# Patient Record
Sex: FEMALE | Race: WHITE | NOT HISPANIC OR LATINO
[De-identification: names, ages, dates, MRNs, and addresses within clinical notes are randomized per-mention and may not be internally consistent; named-entity substitution may affect disease eponyms.]

---

## 2022-03-10 ENCOUNTER — APPOINTMENT (OUTPATIENT)
Dept: FAMILY MEDICINE | Facility: CLINIC | Age: 37
End: 2022-03-10
Payer: COMMERCIAL

## 2022-03-10 VITALS
OXYGEN SATURATION: 98 % | HEART RATE: 84 BPM | DIASTOLIC BLOOD PRESSURE: 72 MMHG | TEMPERATURE: 98 F | BODY MASS INDEX: 22.86 KG/M2 | WEIGHT: 129 LBS | SYSTOLIC BLOOD PRESSURE: 110 MMHG | HEIGHT: 63 IN

## 2022-03-10 DIAGNOSIS — F90.9 ATTENTION-DEFICIT HYPERACTIVITY DISORDER, UNSPECIFIED TYPE: ICD-10-CM

## 2022-03-10 DIAGNOSIS — Z81.1 FAMILY HISTORY OF ALCOHOL ABUSE AND DEPENDENCE: ICD-10-CM

## 2022-03-10 DIAGNOSIS — Z81.8 FAMILY HISTORY OF OTHER MENTAL AND BEHAVIORAL DISORDERS: ICD-10-CM

## 2022-03-10 DIAGNOSIS — Z80.3 FAMILY HISTORY OF MALIGNANT NEOPLASM OF BREAST: ICD-10-CM

## 2022-03-10 DIAGNOSIS — Z87.19 PERSONAL HISTORY OF OTHER DISEASES OF THE DIGESTIVE SYSTEM: ICD-10-CM

## 2022-03-10 DIAGNOSIS — Z80.0 FAMILY HISTORY OF MALIGNANT NEOPLASM OF DIGESTIVE ORGANS: ICD-10-CM

## 2022-03-10 DIAGNOSIS — F32.A ANXIETY DISORDER, UNSPECIFIED: ICD-10-CM

## 2022-03-10 DIAGNOSIS — Z80.9 FAMILY HISTORY OF MALIGNANT NEOPLASM, UNSPECIFIED: ICD-10-CM

## 2022-03-10 DIAGNOSIS — Z86.16 PERSONAL HISTORY OF COVID-19: ICD-10-CM

## 2022-03-10 DIAGNOSIS — Z00.00 ENCOUNTER FOR GENERAL ADULT MEDICAL EXAMINATION W/OUT ABNORMAL FINDINGS: ICD-10-CM

## 2022-03-10 DIAGNOSIS — R53.83 OTHER FATIGUE: ICD-10-CM

## 2022-03-10 DIAGNOSIS — F41.9 ANXIETY DISORDER, UNSPECIFIED: ICD-10-CM

## 2022-03-10 DIAGNOSIS — Z82.49 FAMILY HISTORY OF ISCHEMIC HEART DISEASE AND OTHER DISEASES OF THE CIRCULATORY SYSTEM: ICD-10-CM

## 2022-03-10 PROCEDURE — 99385 PREV VISIT NEW AGE 18-39: CPT | Mod: 25

## 2022-03-10 RX ORDER — BUPROPION HYDROCHLORIDE 150 MG/1
150 TABLET, EXTENDED RELEASE ORAL
Refills: 0 | Status: ACTIVE | COMMUNITY

## 2022-03-10 RX ORDER — BUPROPION HYDROCHLORIDE 300 MG/1
300 TABLET, EXTENDED RELEASE ORAL
Refills: 0 | Status: ACTIVE | COMMUNITY

## 2022-03-10 RX ORDER — ALPRAZOLAM 1 MG/1
1 TABLET ORAL
Refills: 0 | Status: ACTIVE | COMMUNITY

## 2022-03-10 RX ORDER — CETIRIZINE HCL 10 MG
TABLET ORAL
Refills: 0 | Status: ACTIVE | COMMUNITY

## 2022-03-10 RX ORDER — ETONOGESTREL AND ETHINYL ESTRADIOL .12; .015 MG/D; MG/D
0.12-0.015 INSERT, EXTENDED RELEASE VAGINAL
Refills: 0 | Status: ACTIVE | COMMUNITY

## 2022-03-10 NOTE — HISTORY OF PRESENT ILLNESS
[FreeTextEntry1] : Ms. DOLL presents for annual physical.\par  [de-identified] : Ms. DOLL presents for annual physical.\par \par Meets with psych pharmacologist-saw recently; India Duncan in [place]; ADHD, Depression and Anxiety \par Meets with Therapist weekly. \par Wellbutrin 450mg 2 tablets regular release\par Xanax 1mg prn \par Nuvaring for birth control\par \par -Sleeps 8 hours on average\par Works as [employer] fully remote. \par \par Takes Zyrtec as needed for allergies.\par -Allergies to tree nuts, grasses, trees, pollen, dogs and cats \par \par Sx: No surgeries \par Family Hx: Had genetic testing for cancer during pregnancy. \par Paternal Grandma-BRCA passed.\par Dad colon cancer at age 63. Grandpa-liver cancer, maternal gma-CHF, CLL \par Maternal GPA-CHF\par \par 2 children both vaginal deliveries, uncomplicated pregnancies.\par Had COVID January 5th 2022.  Decreased smell. Headaches. \par Had fever, body aches, headaches and nausea, loss of taste and smell. Felt foggy.\par \par COVID initial series completed. \par Scheduling her booster. \par \par Had gastroenteritis in early february.   \par Baby had cold recently.   \par \par Hx of Hiatal Hernia, Hx of IBS;  had endoscopy and colonoscopy.  Hx SIBO.  Last time saw GI before pregnancy. Had ulcer. Was on carafate in 2019.  Hx of Lactose intolerance.

## 2022-03-10 NOTE — PLAN
[FreeTextEntry1] : Will follow up labwork drawn in office today.  Stable exam.  \par \par HM-had pap smear 2019.\par Mood following with therapist and specialist. \par \par Will adjust meds based on labs. \par Patient expressed understanding of plan.\par

## 2022-03-10 NOTE — PHYSICAL EXAM
[Normal Sclera/Conjunctiva] : normal sclera/conjunctiva [Normal Oropharynx] : the oropharynx was normal [Normal TMs] : both tympanic membranes were normal [No Lymphadenopathy] : no lymphadenopathy [Supple] : supple [No Edema] : there was no peripheral edema [No Extremity Clubbing/Cyanosis] : no extremity clubbing/cyanosis [Normal] : affect was normal and insight and judgment were intact

## 2022-03-10 NOTE — HEALTH RISK ASSESSMENT
[Former] : Former [Yes] : Yes [2 - 3 times a week (3 pts)] : 2 - 3  times a week (3 points) [Patient reported PAP Smear was normal] : Patient reported PAP Smear was normal [HIV test declined] : HIV test declined [With Significant Other] : lives with significant other [# of Members in Household ___] :  household currently consist of [unfilled] member(s) [Employed] : employed [# Of Children ___] : has [unfilled] children [de-identified] : marijuana [YearQuit] : 2006 [de-identified] : wine [de-identified] : not exercising [PapSmearDate] : 01/19 [FreeTextEntry2] : Full Time-Fully Remote  [FreeTextEntry3] : Children healthy 16 months, 7 yo

## 2022-03-10 NOTE — PAST MEDICAL HISTORY
[Menstruating] : menstruating [Regular Cycle Intervals] : have been regular [FreeTextEntry1] : LMP 1 week ago

## 2022-03-10 NOTE — REVIEW OF SYSTEMS
[Postnasal Drip] : postnasal drip [Negative] : Genitourinary [Earache] : no earache [Sore Throat] : no sore throat [Shortness Of Breath] : no shortness of breath [Wheezing] : no wheezing [Cough] : no cough [Nausea] : no nausea [Constipation] : no constipation [Diarrhea] : diarrhea [Headache] : no headache [Vomiting] : no vomiting [Dizziness] : no dizziness [Suicidal] : not suicidal [FreeTextEntry7] : TUMS relieving heartburn symptoms [FreeTextEntry2] : some fatigue [de-identified] : Mood doing okay; therapist appointment tonight.

## 2022-03-11 LAB
25(OH)D3 SERPL-MCNC: 22 NG/ML
ALBUMIN SERPL ELPH-MCNC: 4.4 G/DL
ALP BLD-CCNC: 49 U/L
ALT SERPL-CCNC: 10 U/L
ANION GAP SERPL CALC-SCNC: 12 MMOL/L
AST SERPL-CCNC: 10 U/L
BASOPHILS # BLD AUTO: 0.03 K/UL
BASOPHILS NFR BLD AUTO: 0.5 %
BILIRUB SERPL-MCNC: 0.4 MG/DL
BUN SERPL-MCNC: 16 MG/DL
CALCIUM SERPL-MCNC: 10 MG/DL
CHLORIDE SERPL-SCNC: 104 MMOL/L
CHOLEST SERPL-MCNC: 199 MG/DL
CO2 SERPL-SCNC: 25 MMOL/L
CREAT SERPL-MCNC: 0.72 MG/DL
EGFR: 110 ML/MIN/1.73M2
EOSINOPHIL # BLD AUTO: 0.06 K/UL
EOSINOPHIL NFR BLD AUTO: 1 %
ESTIMATED AVERAGE GLUCOSE: 105 MG/DL
FOLATE SERPL-MCNC: 17 NG/ML
GLUCOSE SERPL-MCNC: 89 MG/DL
HBA1C MFR BLD HPLC: 5.3 %
HCT VFR BLD CALC: 42.8 %
HDLC SERPL-MCNC: 56 MG/DL
HGB BLD-MCNC: 13.3 G/DL
IMM GRANULOCYTES NFR BLD AUTO: 0.2 %
LDLC SERPL CALC-MCNC: 122 MG/DL
LYMPHOCYTES # BLD AUTO: 1.79 K/UL
LYMPHOCYTES NFR BLD AUTO: 29.5 %
MAN DIFF?: NORMAL
MCHC RBC-ENTMCNC: 26.8 PG
MCHC RBC-ENTMCNC: 31.1 GM/DL
MCV RBC AUTO: 86.3 FL
MONOCYTES # BLD AUTO: 0.49 K/UL
MONOCYTES NFR BLD AUTO: 8.1 %
NEUTROPHILS # BLD AUTO: 3.68 K/UL
NEUTROPHILS NFR BLD AUTO: 60.7 %
NONHDLC SERPL-MCNC: 143 MG/DL
PLATELET # BLD AUTO: 201 K/UL
POTASSIUM SERPL-SCNC: 4.7 MMOL/L
PROT SERPL-MCNC: 6.6 G/DL
RBC # BLD: 4.96 M/UL
RBC # FLD: 12.9 %
SODIUM SERPL-SCNC: 141 MMOL/L
TRIGL SERPL-MCNC: 105 MG/DL
TSH SERPL-ACNC: 1.96 UIU/ML
VIT B12 SERPL-MCNC: 398 PG/ML
WBC # FLD AUTO: 6.06 K/UL

## 2022-04-11 ENCOUNTER — TRANSCRIPTION ENCOUNTER (OUTPATIENT)
Age: 37
End: 2022-04-11

## 2022-04-12 ENCOUNTER — TRANSCRIPTION ENCOUNTER (OUTPATIENT)
Age: 37
End: 2022-04-12

## 2022-09-18 ENCOUNTER — NON-APPOINTMENT (OUTPATIENT)
Age: 37
End: 2022-09-18

## 2022-10-06 ENCOUNTER — NON-APPOINTMENT (OUTPATIENT)
Age: 37
End: 2022-10-06

## 2023-01-12 ENCOUNTER — APPOINTMENT (OUTPATIENT)
Dept: ULTRASOUND IMAGING | Facility: HOSPITAL | Age: 38
End: 2023-01-12

## 2023-01-12 ENCOUNTER — APPOINTMENT (OUTPATIENT)
Dept: NUCLEAR MEDICINE | Facility: HOSPITAL | Age: 38
End: 2023-01-12

## 2023-01-19 ENCOUNTER — APPOINTMENT (OUTPATIENT)
Dept: NUCLEAR MEDICINE | Facility: HOSPITAL | Age: 38
End: 2023-01-19

## 2023-01-19 ENCOUNTER — APPOINTMENT (OUTPATIENT)
Dept: ULTRASOUND IMAGING | Facility: HOSPITAL | Age: 38
End: 2023-01-19

## 2023-01-19 ENCOUNTER — TRANSCRIPTION ENCOUNTER (OUTPATIENT)
Age: 38
End: 2023-01-19

## 2023-01-19 ENCOUNTER — OUTPATIENT (OUTPATIENT)
Dept: OUTPATIENT SERVICES | Facility: HOSPITAL | Age: 38
LOS: 1 days | End: 2023-01-19
Payer: COMMERCIAL

## 2023-01-19 PROCEDURE — C9803: CPT

## 2023-01-19 PROCEDURE — 78227 HEPATOBIL SYST IMAGE W/DRUG: CPT

## 2023-01-19 PROCEDURE — 76700 US EXAM ABDOM COMPLETE: CPT

## 2023-01-19 PROCEDURE — 78227 HEPATOBIL SYST IMAGE W/DRUG: CPT | Mod: 26

## 2023-01-19 PROCEDURE — A9537: CPT

## 2023-01-19 PROCEDURE — 76700 US EXAM ABDOM COMPLETE: CPT | Mod: 26

## 2023-01-23 DIAGNOSIS — R10.9 UNSPECIFIED ABDOMINAL PAIN: ICD-10-CM

## 2023-01-30 ENCOUNTER — NON-APPOINTMENT (OUTPATIENT)
Age: 38
End: 2023-01-30

## 2023-02-17 ENCOUNTER — APPOINTMENT (OUTPATIENT)
Dept: SURGERY | Facility: CLINIC | Age: 38
End: 2023-02-17
Payer: COMMERCIAL

## 2023-02-17 VITALS
DIASTOLIC BLOOD PRESSURE: 82 MMHG | TEMPERATURE: 96.3 F | HEART RATE: 77 BPM | OXYGEN SATURATION: 97 % | SYSTOLIC BLOOD PRESSURE: 122 MMHG | RESPIRATION RATE: 16 BRPM

## 2023-02-17 VITALS — HEIGHT: 63 IN | WEIGHT: 135 LBS | BODY MASS INDEX: 23.92 KG/M2

## 2023-02-17 DIAGNOSIS — Z87.891 PERSONAL HISTORY OF NICOTINE DEPENDENCE: ICD-10-CM

## 2023-02-17 DIAGNOSIS — Z87.898 PERSONAL HISTORY OF OTHER SPECIFIED CONDITIONS: ICD-10-CM

## 2023-02-17 DIAGNOSIS — Z78.9 OTHER SPECIFIED HEALTH STATUS: ICD-10-CM

## 2023-02-17 DIAGNOSIS — R10.11 RIGHT UPPER QUADRANT PAIN: ICD-10-CM

## 2023-02-17 PROCEDURE — 99203 OFFICE O/P NEW LOW 30 MIN: CPT

## 2023-02-17 NOTE — ASSESSMENT
[FreeTextEntry1] : In summary the patient has vague right-sided abdominal pain.  Ultrasound demonstrates no evidence of cholelithiasis.  Liver function tests are normal.  A CCK HIDA demonstrates mildly diminished gallbladder ejection fraction of 34%.  I had a long conversation with the patient.  I explained that her symptoms may be related to biliary dyskinesia/chronic cholecystitis.  Her right upper quadrant pain was replicated with injection of CCK.  I therefore think a laparoscopic cholecystectomy would be reasonable.  I explained the risks benefits and alternatives of surgery including the risks of bleeding infection the possible need for an open procedure the rare incidence of bile duct injury and the chance that surgery may not alleviate her symptoms.  I explained that from my standpoint the only other test which may change the management would be if an endoscopy were to demonstrate peptic ulcer disease as a possible source of her symptoms.  She will be giving this some thought and will be contacting my office if she decides to proceed with surgery

## 2023-02-17 NOTE — CONSULT LETTER
[Dear  ___] : Dear  [unfilled], [Consult Letter:] : I had the pleasure of evaluating your patient, [unfilled]. [Please see my note below.] : Please see my note below. [Consult Closing:] : Thank you very much for allowing me to participate in the care of this patient.  If you have any questions, please do not hesitate to contact me. [Sincerely,] : Sincerely, [FreeTextEntry3] : Isai Caputo M.D., F.A.C.S, F.A.S.C.R.S [DrNicole  ___] : Dr. STEPHENSON

## 2023-02-17 NOTE — PHYSICAL EXAM
[Normal Breath Sounds] : Normal breath sounds [Normal Rate and Rhythm] : normal rate and rhythm [Abdominal Masses] : No abdominal masses [Abdomen Tenderness] : ~T ~M No abdominal tenderness [No HSM] : no hepatosplenomegaly [No Rash or Lesion] : No rash or lesion [Alert] : alert [Calm] : calm [de-identified] : nad [de-identified] : nl Enoxaparin/Lovenox [de-identified] : No jaundice or scleral icterus

## 2023-02-17 NOTE — HISTORY OF PRESENT ILLNESS
[de-identified] : Brittany is a 37yr. old female is here for initial consultation, gallbladder\par \par US abdomen  1/19/23 - No evidence of cholelithiasis or acute cholecystitis. A 1.3 cm echogenic lesion in the right hepatic lobe, probably represents hemangioma.\par \par Nuclear study 1/19/23 - Abnormal gallbladder ejection fraction 0f 34%. Findings can be seen with biliary dyskinesia/chronic acalculous cholecystitis\par \par Today patient reports intermittent chronic RUQ dull sometimes sharp pain for few years.   US and NM by Dr. Johns at [facility] on 1/19/23 for abdominal pain.   Patient went to ED for abdominal pain in the past and was told she had passed kidney stone, no tests done.   The pain does not radiate.    Patient does not know if it is associated with food, reports she has other GI issues ulcers, acid reflux, H. pylori, IBS.  BMs 3 times daily regular/soft/loose depending on her diet.  No issues with voiding.  Not on any anticoagulants.  No history of abdominal surgery.   Family history of maternal grandmother, gallstones.  \par

## 2023-09-13 ENCOUNTER — APPOINTMENT (OUTPATIENT)
Dept: OBGYN | Facility: CLINIC | Age: 38
End: 2023-09-13
Payer: COMMERCIAL

## 2023-09-13 VITALS
HEIGHT: 63 IN | DIASTOLIC BLOOD PRESSURE: 92 MMHG | WEIGHT: 140 LBS | SYSTOLIC BLOOD PRESSURE: 137 MMHG | BODY MASS INDEX: 24.8 KG/M2

## 2023-09-13 DIAGNOSIS — K82.8 OTHER SPECIFIED DISEASES OF GALLBLADDER: ICD-10-CM

## 2023-09-13 DIAGNOSIS — Z30.09 ENCOUNTER FOR OTHER GENERAL COUNSELING AND ADVICE ON CONTRACEPTION: ICD-10-CM

## 2023-09-13 PROCEDURE — 99203 OFFICE O/P NEW LOW 30 MIN: CPT

## 2023-09-18 PROBLEM — K82.8 BILIARY DYSKINESIA: Status: ACTIVE | Noted: 2023-02-17

## 2023-10-10 ENCOUNTER — OUTPATIENT (OUTPATIENT)
Dept: OUTPATIENT SERVICES | Facility: HOSPITAL | Age: 38
LOS: 1 days | End: 2023-10-10
Payer: COMMERCIAL

## 2023-10-10 ENCOUNTER — APPOINTMENT (OUTPATIENT)
Dept: ULTRASOUND IMAGING | Facility: CLINIC | Age: 38
End: 2023-10-10
Payer: COMMERCIAL

## 2023-10-10 DIAGNOSIS — Z00.8 ENCOUNTER FOR OTHER GENERAL EXAMINATION: ICD-10-CM

## 2023-10-10 DIAGNOSIS — Z30.09 ENCOUNTER FOR OTHER GENERAL COUNSELING AND ADVICE ON CONTRACEPTION: ICD-10-CM

## 2023-10-10 PROCEDURE — 76830 TRANSVAGINAL US NON-OB: CPT | Mod: 26

## 2023-10-10 PROCEDURE — 76856 US EXAM PELVIC COMPLETE: CPT | Mod: 26,59

## 2023-10-10 PROCEDURE — 76830 TRANSVAGINAL US NON-OB: CPT

## 2023-10-10 PROCEDURE — 76856 US EXAM PELVIC COMPLETE: CPT

## 2023-11-02 ENCOUNTER — OUTPATIENT (OUTPATIENT)
Dept: OUTPATIENT SERVICES | Facility: HOSPITAL | Age: 38
LOS: 1 days | End: 2023-11-02
Payer: COMMERCIAL

## 2023-11-02 VITALS
HEART RATE: 71 BPM | WEIGHT: 136.03 LBS | RESPIRATION RATE: 16 BRPM | OXYGEN SATURATION: 96 % | DIASTOLIC BLOOD PRESSURE: 68 MMHG | SYSTOLIC BLOOD PRESSURE: 99 MMHG | TEMPERATURE: 99 F | HEIGHT: 63 IN

## 2023-11-02 DIAGNOSIS — Z98.890 OTHER SPECIFIED POSTPROCEDURAL STATES: Chronic | ICD-10-CM

## 2023-11-02 DIAGNOSIS — Z29.9 ENCOUNTER FOR PROPHYLACTIC MEASURES, UNSPECIFIED: ICD-10-CM

## 2023-11-02 DIAGNOSIS — Z30.09 ENCOUNTER FOR OTHER GENERAL COUNSELING AND ADVICE ON CONTRACEPTION: ICD-10-CM

## 2023-11-02 DIAGNOSIS — Z30.2 ENCOUNTER FOR STERILIZATION: ICD-10-CM

## 2023-11-02 DIAGNOSIS — K82.8 OTHER SPECIFIED DISEASES OF GALLBLADDER: ICD-10-CM

## 2023-11-02 DIAGNOSIS — F12.90 CANNABIS USE, UNSPECIFIED, UNCOMPLICATED: ICD-10-CM

## 2023-11-02 DIAGNOSIS — Z01.818 ENCOUNTER FOR OTHER PREPROCEDURAL EXAMINATION: ICD-10-CM

## 2023-11-02 LAB
ALBUMIN SERPL ELPH-MCNC: 4.1 G/DL — SIGNIFICANT CHANGE UP (ref 3.3–5)
ALBUMIN SERPL ELPH-MCNC: 4.1 G/DL — SIGNIFICANT CHANGE UP (ref 3.3–5)
ALP SERPL-CCNC: 56 U/L — SIGNIFICANT CHANGE UP (ref 40–120)
ALP SERPL-CCNC: 56 U/L — SIGNIFICANT CHANGE UP (ref 40–120)
ALT FLD-CCNC: 28 U/L — SIGNIFICANT CHANGE UP (ref 10–45)
ALT FLD-CCNC: 28 U/L — SIGNIFICANT CHANGE UP (ref 10–45)
ANION GAP SERPL CALC-SCNC: 13 MMOL/L — SIGNIFICANT CHANGE UP (ref 5–17)
ANION GAP SERPL CALC-SCNC: 13 MMOL/L — SIGNIFICANT CHANGE UP (ref 5–17)
AST SERPL-CCNC: 16 U/L — SIGNIFICANT CHANGE UP (ref 10–40)
AST SERPL-CCNC: 16 U/L — SIGNIFICANT CHANGE UP (ref 10–40)
BILIRUB SERPL-MCNC: 0.3 MG/DL — SIGNIFICANT CHANGE UP (ref 0.2–1.2)
BILIRUB SERPL-MCNC: 0.3 MG/DL — SIGNIFICANT CHANGE UP (ref 0.2–1.2)
BLD GP AB SCN SERPL QL: NEGATIVE — SIGNIFICANT CHANGE UP
BLD GP AB SCN SERPL QL: NEGATIVE — SIGNIFICANT CHANGE UP
BUN SERPL-MCNC: 14 MG/DL — SIGNIFICANT CHANGE UP (ref 7–23)
BUN SERPL-MCNC: 14 MG/DL — SIGNIFICANT CHANGE UP (ref 7–23)
CALCIUM SERPL-MCNC: 9.1 MG/DL — SIGNIFICANT CHANGE UP (ref 8.4–10.5)
CALCIUM SERPL-MCNC: 9.1 MG/DL — SIGNIFICANT CHANGE UP (ref 8.4–10.5)
CHLORIDE SERPL-SCNC: 103 MMOL/L — SIGNIFICANT CHANGE UP (ref 96–108)
CHLORIDE SERPL-SCNC: 103 MMOL/L — SIGNIFICANT CHANGE UP (ref 96–108)
CO2 SERPL-SCNC: 24 MMOL/L — SIGNIFICANT CHANGE UP (ref 22–31)
CO2 SERPL-SCNC: 24 MMOL/L — SIGNIFICANT CHANGE UP (ref 22–31)
CREAT SERPL-MCNC: 0.57 MG/DL — SIGNIFICANT CHANGE UP (ref 0.5–1.3)
CREAT SERPL-MCNC: 0.57 MG/DL — SIGNIFICANT CHANGE UP (ref 0.5–1.3)
EGFR: 118 ML/MIN/1.73M2 — SIGNIFICANT CHANGE UP
EGFR: 118 ML/MIN/1.73M2 — SIGNIFICANT CHANGE UP
GLUCOSE SERPL-MCNC: 85 MG/DL — SIGNIFICANT CHANGE UP (ref 70–99)
GLUCOSE SERPL-MCNC: 85 MG/DL — SIGNIFICANT CHANGE UP (ref 70–99)
HCT VFR BLD CALC: 39.5 % — SIGNIFICANT CHANGE UP (ref 34.5–45)
HCT VFR BLD CALC: 39.5 % — SIGNIFICANT CHANGE UP (ref 34.5–45)
HGB BLD-MCNC: 12.3 G/DL — SIGNIFICANT CHANGE UP (ref 11.5–15.5)
HGB BLD-MCNC: 12.3 G/DL — SIGNIFICANT CHANGE UP (ref 11.5–15.5)
MCHC RBC-ENTMCNC: 26.8 PG — LOW (ref 27–34)
MCHC RBC-ENTMCNC: 26.8 PG — LOW (ref 27–34)
MCHC RBC-ENTMCNC: 31.1 GM/DL — LOW (ref 32–36)
MCHC RBC-ENTMCNC: 31.1 GM/DL — LOW (ref 32–36)
MCV RBC AUTO: 86.1 FL — SIGNIFICANT CHANGE UP (ref 80–100)
MCV RBC AUTO: 86.1 FL — SIGNIFICANT CHANGE UP (ref 80–100)
NRBC # BLD: 0 /100 WBCS — SIGNIFICANT CHANGE UP (ref 0–0)
NRBC # BLD: 0 /100 WBCS — SIGNIFICANT CHANGE UP (ref 0–0)
PLATELET # BLD AUTO: 189 K/UL — SIGNIFICANT CHANGE UP (ref 150–400)
PLATELET # BLD AUTO: 189 K/UL — SIGNIFICANT CHANGE UP (ref 150–400)
POTASSIUM SERPL-MCNC: 3.7 MMOL/L — SIGNIFICANT CHANGE UP (ref 3.5–5.3)
POTASSIUM SERPL-MCNC: 3.7 MMOL/L — SIGNIFICANT CHANGE UP (ref 3.5–5.3)
POTASSIUM SERPL-SCNC: 3.7 MMOL/L — SIGNIFICANT CHANGE UP (ref 3.5–5.3)
POTASSIUM SERPL-SCNC: 3.7 MMOL/L — SIGNIFICANT CHANGE UP (ref 3.5–5.3)
PROT SERPL-MCNC: 6.2 G/DL — SIGNIFICANT CHANGE UP (ref 6–8.3)
PROT SERPL-MCNC: 6.2 G/DL — SIGNIFICANT CHANGE UP (ref 6–8.3)
RBC # BLD: 4.59 M/UL — SIGNIFICANT CHANGE UP (ref 3.8–5.2)
RBC # BLD: 4.59 M/UL — SIGNIFICANT CHANGE UP (ref 3.8–5.2)
RBC # FLD: 12.7 % — SIGNIFICANT CHANGE UP (ref 10.3–14.5)
RBC # FLD: 12.7 % — SIGNIFICANT CHANGE UP (ref 10.3–14.5)
RH IG SCN BLD-IMP: POSITIVE — SIGNIFICANT CHANGE UP
RH IG SCN BLD-IMP: POSITIVE — SIGNIFICANT CHANGE UP
SODIUM SERPL-SCNC: 140 MMOL/L — SIGNIFICANT CHANGE UP (ref 135–145)
SODIUM SERPL-SCNC: 140 MMOL/L — SIGNIFICANT CHANGE UP (ref 135–145)
WBC # BLD: 5.77 K/UL — SIGNIFICANT CHANGE UP (ref 3.8–10.5)
WBC # BLD: 5.77 K/UL — SIGNIFICANT CHANGE UP (ref 3.8–10.5)
WBC # FLD AUTO: 5.77 K/UL — SIGNIFICANT CHANGE UP (ref 3.8–10.5)
WBC # FLD AUTO: 5.77 K/UL — SIGNIFICANT CHANGE UP (ref 3.8–10.5)

## 2023-11-02 PROCEDURE — 85027 COMPLETE CBC AUTOMATED: CPT

## 2023-11-02 PROCEDURE — 80053 COMPREHEN METABOLIC PANEL: CPT

## 2023-11-02 PROCEDURE — 86900 BLOOD TYPING SEROLOGIC ABO: CPT

## 2023-11-02 PROCEDURE — 36415 COLL VENOUS BLD VENIPUNCTURE: CPT

## 2023-11-02 PROCEDURE — 86850 RBC ANTIBODY SCREEN: CPT

## 2023-11-02 PROCEDURE — 86901 BLOOD TYPING SEROLOGIC RH(D): CPT

## 2023-11-02 PROCEDURE — G0463: CPT

## 2023-11-02 RX ORDER — SODIUM CHLORIDE 9 MG/ML
1000 INJECTION, SOLUTION INTRAVENOUS
Refills: 0 | Status: DISCONTINUED | OUTPATIENT
Start: 2023-11-20 | End: 2023-11-20

## 2023-11-02 RX ORDER — CELECOXIB 200 MG/1
400 CAPSULE ORAL ONCE
Refills: 0 | Status: COMPLETED | OUTPATIENT
Start: 2023-11-20 | End: 2023-11-20

## 2023-11-02 RX ORDER — ACETAMINOPHEN 500 MG
1000 TABLET ORAL ONCE
Refills: 0 | Status: COMPLETED | OUTPATIENT
Start: 2023-11-20 | End: 2023-11-20

## 2023-11-02 RX ORDER — GABAPENTIN 400 MG/1
600 CAPSULE ORAL ONCE
Refills: 0 | Status: COMPLETED | OUTPATIENT
Start: 2023-11-20 | End: 2023-11-20

## 2023-11-02 RX ORDER — CHLORHEXIDINE GLUCONATE 213 G/1000ML
1 SOLUTION TOPICAL ONCE
Refills: 0 | Status: DISCONTINUED | OUTPATIENT
Start: 2023-11-20 | End: 2023-12-05

## 2023-11-02 RX ORDER — CEFOTETAN DISODIUM 1 G
2 VIAL (EA) INJECTION ONCE
Refills: 0 | Status: DISCONTINUED | OUTPATIENT
Start: 2023-11-20 | End: 2023-12-05

## 2023-11-02 RX ORDER — LIDOCAINE HCL 20 MG/ML
0.2 VIAL (ML) INJECTION ONCE
Refills: 0 | Status: DISCONTINUED | OUTPATIENT
Start: 2023-11-20 | End: 2023-11-20

## 2023-11-02 NOTE — H&P PST ADULT - NSANTHOSAYNRD_GEN_A_CORE
No. DEVENDRA screening performed.  STOP BANG Legend: 0-2 = LOW Risk; 3-4 = INTERMEDIATE Risk; 5-8 = HIGH Risk

## 2023-11-02 NOTE — H&P PST ADULT - PROBLEM SELECTOR PLAN 2
Pt is scheduled for a laparoscopic salpingectomy, possible ex-lap with Dr. Bell during the procedure scheduled with Dr. Caputo on 11/20/23 at the ambulatory care center  CBC, BMP and T/S ordered and obtained at [facility]  ABO on admit  ERP medications ordered DOS

## 2023-11-02 NOTE — H&P PST ADULT - ASSESSMENT
DASI score: 7.99  DASI activity: Able to walk 2-3 blocks, ADLs, Grocery Shopping, 1 Flight of Stairs   Loose teeth or denture: guy CASILLASROJAS VTE 2.0 SCORE [CLOT updated 2019]    AGE RELATED RISK FACTORS                                                       MOBILITY RELATED FACTORS  [ ] Age 41-60 years                                            (1 Point)                    [ ] Bed rest                                                        (1 Point)  [ ] Age: 61-74 years                                           (2 Points)                  [ ] Plaster cast                                                   (2 Points)  [ ] Age= 75 years                                              (3 Points)                    [ ] Bed bound for more than 72 hours                 (2 Points)    DISEASE RELATED RISK FACTORS                                               GENDER SPECIFIC FACTORS  [ ] Edema in the lower extremities                       (1 Point)              [ ] Pregnancy                                                     (1 Point)  [ ] Varicose veins                                               (1 Point)                     [ ] Post-partum < 6 weeks                                   (1 Point)             [ ] BMI > 25 Kg/m2                                            (1 Point)                     [ ] Hormonal therapy  or oral contraception          (1 Point)                 [ ] Sepsis (in the previous month)                        (1 Point)               [ ] History of pregnancy complications                 (1 point)  [ ] Pneumonia or serious lung disease                                               [ ] Unexplained or recurrent                     (1 Point)           (in the previous month)                               (1 Point)  [ ] Abnormal pulmonary function test                     (1 Point)                 SURGERY RELATED RISK FACTORS  [ ] Acute myocardial infarction                              (1 Point)               [ ]  Section                                             (1 Point)  [ ] Congestive heart failure (in the previous month)  (1 Point)      [ ] Minor surgery                                                  (1 Point)   [ ] Inflammatory bowel disease                             (1 Point)               [ ] Arthroscopic surgery                                        (2 Points)  [ ] Central venous access                                      (2 Points)                [ X] General surgery lasting more than 45 minutes (2 points)  [ ] Malignancy- Present or previous                   (2 Points)                [ ] Elective arthroplasty                                         (5 points)    [ ] Stroke (in the previous month)                          (5 Points)                                                                                                                                                           HEMATOLOGY RELATED FACTORS                                                 TRAUMA RELATED RISK FACTORS  [ ] Prior episodes of VTE                                     (3 Points)                [ ] Fracture of the hip, pelvis, or leg                       (5 Points)  [ ] Positive family history for VTE                         (3 Points)             [ ] Acute spinal cord injury (in the previous month)  (5 Points)  [ ] Prothrombin 93063 A                                     (3 Points)               [ ] Paralysis  (less than 1 month)                             (5 Points)  [ ] Factor V Leiden                                             (3 Points)                  [ ] Multiple Trauma within 1 month                        (5 Points)  [ ] Lupus anticoagulants                                     (3 Points)                                                           [ ] Anticardiolipin antibodies                               (3 Points)                                                       [ ] High homocysteine in the blood                      (3 Points)                                             [ ] Other congenital or acquired thrombophilia      (3 Points)                                                [ ] Heparin induced thrombocytopenia                  (3 Points)                                     Total Score [   2       ] DASI score: 7.99  DASI activity: Able to walk 2-3 blocks, ADLs, Grocery Shopping, 1 Flight of Stairs, works out at home 3x per week x 45 minutes ([employer], Running, Yoga), works as a   Loose teeth or denture: denies CAPRINI VTE 2.0 SCORE [CLOT updated 2019]    AGE RELATED RISK FACTORS                                                       MOBILITY RELATED FACTORS  [ ] Age 41-60 years                                            (1 Point)                    [ ] Bed rest                                                        (1 Point)  [ ] Age: 61-74 years                                           (2 Points)                  [ ] Plaster cast                                                   (2 Points)  [ ] Age= 75 years                                              (3 Points)                    [ ] Bed bound for more than 72 hours                 (2 Points)    DISEASE RELATED RISK FACTORS                                               GENDER SPECIFIC FACTORS  [ ] Edema in the lower extremities                       (1 Point)              [ ] Pregnancy                                                     (1 Point)  [ ] Varicose veins                                               (1 Point)                     [ ] Post-partum < 6 weeks                                   (1 Point)             [ ] BMI > 25 Kg/m2                                            (1 Point)                     [ ] Hormonal therapy  or oral contraception          (1 Point)                 [ ] Sepsis (in the previous month)                        (1 Point)               [ ] History of pregnancy complications                 (1 point)  [ ] Pneumonia or serious lung disease                                               [ ] Unexplained or recurrent                     (1 Point)           (in the previous month)                               (1 Point)  [ ] Abnormal pulmonary function test                     (1 Point)                 SURGERY RELATED RISK FACTORS  [ ] Acute myocardial infarction                              (1 Point)               [ ]  Section                                             (1 Point)  [ ] Congestive heart failure (in the previous month)  (1 Point)      [ ] Minor surgery                                                  (1 Point)   [ ] Inflammatory bowel disease                             (1 Point)               [ ] Arthroscopic surgery                                        (2 Points)  [ ] Central venous access                                      (2 Points)                [ X] General surgery lasting more than 45 minutes (2 points)  [ ] Malignancy- Present or previous                   (2 Points)                [ ] Elective arthroplasty                                         (5 points)    [ ] Stroke (in the previous month)                          (5 Points)                                                                                                                                                           HEMATOLOGY RELATED FACTORS                                                 TRAUMA RELATED RISK FACTORS  [ ] Prior episodes of VTE                                     (3 Points)                [ ] Fracture of the hip, pelvis, or leg                       (5 Points)  [ ] Positive family history for VTE                         (3 Points)             [ ] Acute spinal cord injury (in the previous month)  (5 Points)  [ ] Prothrombin 40484 A                                     (3 Points)               [ ] Paralysis  (less than 1 month)                             (5 Points)  [ ] Factor V Leiden                                             (3 Points)                  [ ] Multiple Trauma within 1 month                        (5 Points)  [ ] Lupus anticoagulants                                     (3 Points)                                                           [ ] Anticardiolipin antibodies                               (3 Points)                                                       [ ] High homocysteine in the blood                      (3 Points)                                             [ ] Other congenital or acquired thrombophilia      (3 Points)                                                [ ] Heparin induced thrombocytopenia                  (3 Points)                                     Total Score [   2       ]

## 2023-11-02 NOTE — H&P PST ADULT - PROBLEM SELECTOR PLAN 3
The Caprini score indicates that this patient is low risk for a VTE event (score 0-2).  VTE prophylaxis should focus on early ambulation.  Intermittent compression devices (IPC) may be of benefit to some patients Pt advised to abstain from marijuana use 72 hours prior to upcoming surgery with last use on 11/17/23

## 2023-11-02 NOTE — H&P PST ADULT - HISTORY OF PRESENT ILLNESS
38 year old female with PMH former smoker (0.10 PPD x 7 years; Quit in 2009), anxiety/depression (on lamictal), hiatal hernia with GERD (meds PRN - diet-controlled), mild asthma (rarely uses rescue inhaler/no intubations) and ADHD reports c/o chronic RUQ abdominal pain that is intermittent, dull and sometimes sharp for a few years. The pain does not radiate. It is not a/w food. She has 3 BMs daily that are regular/soft/loose depending on her diet. She has intermittent nausea and diarrhea but denies vomiting. US abdomen from 1/19/23 showed no evidence of cholelithiasis or acute cholecystitis. Nuclear study from 1/19/23 demonstrated an abnormal gallbladder ejection fraction of 34%. As per Dr. Caputo's note, findings can be seen with biliary dyskinesia/chronic acalculous cholecystitis. Pt now presents to [facility] for scheduled laparoscopic cholecystectomy with Dr. Caputo on 11/20/23. Pt also is interested in sterilization at the time of her procedure with Dr. Caputo. Pt will also be scheduled for a laparoscopic bilateral salpingectomy, possible exploratory laparotomy with Dr. Bell on 11/20/23 during the procedure with Dr. Caputo. 38 year old female with PMH former smoker (0.10 PPD x 7 years; Quit in 2009), anxiety/depression (on lamictal), daily marijuana use, hiatal hernia with GERD (meds PRN - diet-controlled) and mild asthma (rarely uses rescue inhaler/no intubations) reports c/o chronic RUQ abdominal pain that is intermittent, dull and sometimes sharp for a few years. The pain does not radiate. It is not a/w food. She has 3 BMs daily that are regular/soft/loose depending on her diet. She has intermittent nausea and diarrhea but denies vomiting. US abdomen from 1/19/23 showed no evidence of cholelithiasis or acute cholecystitis. Nuclear study from 1/19/23 demonstrated an abnormal gallbladder ejection fraction of 34%. As per Dr. Caputo's note, findings can be seen with biliary dyskinesia/chronic acalculous cholecystitis. Pt now presents to [facility] for scheduled laparoscopic cholecystectomy with Dr. Caputo on 11/20/23. Pt also is interested in sterilization at the time of her procedure with Dr. Caputo. Pt will also be scheduled for a laparoscopic bilateral salpingectomy, possible exploratory laparotomy with Dr. Bell on 11/20/23 during the procedure with Dr. Caputo at the ambulatory care center.

## 2023-11-02 NOTE — H&P PST ADULT - NEGATIVE GASTROINTESTINAL SYMPTOMS
dull RUQ abdominal pain/no vomiting/no change in bowel habits/no melena/no hematochezia/no steatorrhea/no jaundice

## 2023-11-02 NOTE — H&P PST ADULT - PROBLEM SELECTOR PLAN 1
Pt is scheduled for a laparoscopic cholecystectomy on 11/20/23 with Dr. Caputo at the ambulatory care center  CBC, CMP ordered and obtained at [facility]  Urine HCG on admit

## 2023-11-02 NOTE — H&P PST ADULT - LAST ECHOCARDIOGRAM
possibly 5 years ago to evaluate for mitral valve prolapse - as per patient, no prolapse diagnosed and results were normal

## 2023-11-02 NOTE — H&P PST ADULT - NSICDXPASTSURGICALHX_GEN_ALL_CORE_FT
PAST SURGICAL HISTORY:  History of colposcopy     History of endoscopy      (normal spontaneous vaginal delivery)

## 2023-11-02 NOTE — H&P PST ADULT - ATTENDING COMMENTS
GYN Attg:  Pt consented for  laparoscopic bilateral salpingectomy. All questions answered.  ESME Bell MD GYN Attg:  Pt consented for  laparoscopic bilateral salpingectomy in conjunction with LSC cholecystectomy by Dr. Caputo. All questions answered.  ESME Bell MD GYN Attg:  Pt consented for  laparoscopic bilateral salpingectomy in conjunction with LSC cholecystectomy by Dr. Caputo. All questions answered.  ESME Bell MD    I have seen and examined the patient. I agree with the above surgery resident's note.

## 2023-11-02 NOTE — H&P PST ADULT - NSICDXPASTMEDICALHX_GEN_ALL_CORE_FT
PAST MEDICAL HISTORY:  2019 novel coronavirus disease (COVID-19)     ADHD     Anxiety and depression     Encounter for sterilization     Former smoker     Hiatal hernia with GERD     History of IBS     Mild asthma     Other specified diseases of gallbladder      PAST MEDICAL HISTORY:  2019 novel coronavirus disease (COVID-19)     Anxiety and depression     Encounter for sterilization     Former smoker     Hiatal hernia with GERD     History of IBS     Marijuana use     Mild asthma     Other specified diseases of gallbladder

## 2023-11-17 PROBLEM — Z87.891 PERSONAL HISTORY OF NICOTINE DEPENDENCE: Chronic | Status: ACTIVE | Noted: 2023-11-02

## 2023-11-17 PROBLEM — F12.90 CANNABIS USE, UNSPECIFIED, UNCOMPLICATED: Chronic | Status: ACTIVE | Noted: 2023-11-02

## 2023-11-17 PROBLEM — U07.1 COVID-19: Chronic | Status: ACTIVE | Noted: 2023-11-02

## 2023-11-17 PROBLEM — F41.9 ANXIETY DISORDER, UNSPECIFIED: Chronic | Status: ACTIVE | Noted: 2023-11-02

## 2023-11-17 PROBLEM — Z87.19 PERSONAL HISTORY OF OTHER DISEASES OF THE DIGESTIVE SYSTEM: Chronic | Status: ACTIVE | Noted: 2023-11-02

## 2023-11-17 PROBLEM — K82.8 OTHER SPECIFIED DISEASES OF GALLBLADDER: Chronic | Status: ACTIVE | Noted: 2023-11-02

## 2023-11-17 PROBLEM — J45.909 UNSPECIFIED ASTHMA, UNCOMPLICATED: Chronic | Status: ACTIVE | Noted: 2023-11-02

## 2023-11-17 PROBLEM — K44.9 DIAPHRAGMATIC HERNIA WITHOUT OBSTRUCTION OR GANGRENE: Chronic | Status: ACTIVE | Noted: 2023-11-02

## 2023-11-17 PROBLEM — Z30.2 ENCOUNTER FOR STERILIZATION: Chronic | Status: ACTIVE | Noted: 2023-11-02

## 2023-11-20 ENCOUNTER — TRANSCRIPTION ENCOUNTER (OUTPATIENT)
Age: 38
End: 2023-11-20

## 2023-11-20 ENCOUNTER — OUTPATIENT (OUTPATIENT)
Dept: OUTPATIENT SERVICES | Facility: HOSPITAL | Age: 38
LOS: 1 days | End: 2023-11-20
Payer: COMMERCIAL

## 2023-11-20 ENCOUNTER — APPOINTMENT (OUTPATIENT)
Dept: SURGERY | Facility: HOSPITAL | Age: 38
End: 2023-11-20
Payer: COMMERCIAL

## 2023-11-20 ENCOUNTER — RESULT REVIEW (OUTPATIENT)
Age: 38
End: 2023-11-20

## 2023-11-20 VITALS
DIASTOLIC BLOOD PRESSURE: 75 MMHG | WEIGHT: 136.03 LBS | HEART RATE: 68 BPM | RESPIRATION RATE: 18 BRPM | SYSTOLIC BLOOD PRESSURE: 139 MMHG | OXYGEN SATURATION: 96 % | HEIGHT: 63 IN | TEMPERATURE: 98 F

## 2023-11-20 VITALS
OXYGEN SATURATION: 98 % | SYSTOLIC BLOOD PRESSURE: 135 MMHG | DIASTOLIC BLOOD PRESSURE: 85 MMHG | HEART RATE: 75 BPM | RESPIRATION RATE: 16 BRPM

## 2023-11-20 DIAGNOSIS — Z01.818 ENCOUNTER FOR OTHER PREPROCEDURAL EXAMINATION: ICD-10-CM

## 2023-11-20 DIAGNOSIS — Z98.890 OTHER SPECIFIED POSTPROCEDURAL STATES: Chronic | ICD-10-CM

## 2023-11-20 DIAGNOSIS — Z30.09 ENCOUNTER FOR OTHER GENERAL COUNSELING AND ADVICE ON CONTRACEPTION: ICD-10-CM

## 2023-11-20 DIAGNOSIS — K82.8 OTHER SPECIFIED DISEASES OF GALLBLADDER: ICD-10-CM

## 2023-11-20 LAB
HCG UR QL: NEGATIVE — SIGNIFICANT CHANGE UP
HCG UR QL: NEGATIVE — SIGNIFICANT CHANGE UP
RH IG SCN BLD-IMP: POSITIVE — SIGNIFICANT CHANGE UP
RH IG SCN BLD-IMP: POSITIVE — SIGNIFICANT CHANGE UP

## 2023-11-20 PROCEDURE — 88304 TISSUE EXAM BY PATHOLOGIST: CPT

## 2023-11-20 PROCEDURE — C9399: CPT

## 2023-11-20 PROCEDURE — 88304 TISSUE EXAM BY PATHOLOGIST: CPT | Mod: 26

## 2023-11-20 PROCEDURE — 88302 TISSUE EXAM BY PATHOLOGIST: CPT

## 2023-11-20 PROCEDURE — C1889: CPT

## 2023-11-20 PROCEDURE — 58661 LAPAROSCOPY REMOVE ADNEXA: CPT

## 2023-11-20 PROCEDURE — 81025 URINE PREGNANCY TEST: CPT

## 2023-11-20 PROCEDURE — 88302 TISSUE EXAM BY PATHOLOGIST: CPT | Mod: 26

## 2023-11-20 PROCEDURE — 47562 LAPAROSCOPIC CHOLECYSTECTOMY: CPT

## 2023-11-20 DEVICE — LIGATING CLIPS WECK HEMOLOK POLYMER MEDIUM-LARGE (GREEN) 6
Type: IMPLANTABLE DEVICE | Site: BILATERAL | Status: NON-FUNCTIONAL
Removed: 2023-11-20

## 2023-11-20 DEVICE — CLIP APPLIER COVIDIEN ENDOCLIP III 5MM
Type: IMPLANTABLE DEVICE | Site: BILATERAL | Status: NON-FUNCTIONAL
Removed: 2023-11-20

## 2023-11-20 RX ORDER — OXYCODONE HYDROCHLORIDE 5 MG/1
1 TABLET ORAL
Qty: 10 | Refills: 0
Start: 2023-11-20

## 2023-11-20 RX ORDER — ALPRAZOLAM 0.25 MG
1 TABLET ORAL
Refills: 0 | DISCHARGE

## 2023-11-20 RX ORDER — ONDANSETRON 8 MG/1
4 TABLET, FILM COATED ORAL ONCE
Refills: 0 | Status: COMPLETED | OUTPATIENT
Start: 2023-11-20 | End: 2023-11-20

## 2023-11-20 RX ORDER — INFLUENZA VIRUS VACCINE 15; 15; 15; 15 UG/.5ML; UG/.5ML; UG/.5ML; UG/.5ML
0.5 SUSPENSION INTRAMUSCULAR ONCE
Refills: 0 | Status: DISCONTINUED | OUTPATIENT
Start: 2023-11-20 | End: 2023-12-05

## 2023-11-20 RX ORDER — HYDROMORPHONE HYDROCHLORIDE 2 MG/ML
0.5 INJECTION INTRAMUSCULAR; INTRAVENOUS; SUBCUTANEOUS
Refills: 0 | Status: DISCONTINUED | OUTPATIENT
Start: 2023-11-20 | End: 2023-11-20

## 2023-11-20 RX ORDER — LAMOTRIGINE 25 MG/1
1 TABLET, ORALLY DISINTEGRATING ORAL
Refills: 0 | DISCHARGE

## 2023-11-20 RX ORDER — OXYCODONE HYDROCHLORIDE 5 MG/1
5 TABLET ORAL ONCE
Refills: 0 | Status: DISCONTINUED | OUTPATIENT
Start: 2023-11-20 | End: 2023-11-20

## 2023-11-20 RX ADMIN — OXYCODONE HYDROCHLORIDE 5 MILLIGRAM(S): 5 TABLET ORAL at 21:30

## 2023-11-20 RX ADMIN — Medication 1000 MILLIGRAM(S): at 15:47

## 2023-11-20 RX ADMIN — GABAPENTIN 600 MILLIGRAM(S): 400 CAPSULE ORAL at 15:47

## 2023-11-20 RX ADMIN — OXYCODONE HYDROCHLORIDE 5 MILLIGRAM(S): 5 TABLET ORAL at 20:45

## 2023-11-20 RX ADMIN — HYDROMORPHONE HYDROCHLORIDE 0.5 MILLIGRAM(S): 2 INJECTION INTRAMUSCULAR; INTRAVENOUS; SUBCUTANEOUS at 19:17

## 2023-11-20 RX ADMIN — CELECOXIB 400 MILLIGRAM(S): 200 CAPSULE ORAL at 15:50

## 2023-11-20 RX ADMIN — ONDANSETRON 4 MILLIGRAM(S): 8 TABLET, FILM COATED ORAL at 18:35

## 2023-11-20 NOTE — PRE-OP CHECKLIST - IV STARTED
Problem: Infection - Risk of, Puerperal Infection:  Goal: Will show no infection signs and symptoms  Description: Will show no infection signs and symptoms  Outcome: Met This Shift     Problem: Mood - Altered:  Goal: Mood stable  Description: Mood stable  Outcome: Met This Shift     Problem: Pain - Acute:  Goal: Pain level will decrease  Description: Pain level will decrease  Outcome: Met This Shift yes

## 2023-11-20 NOTE — ASU DISCHARGE PLAN (ADULT/PEDIATRIC) - NS MD DC FALL RISK RISK
For information on Fall & Injury Prevention, visit: [email or web address] OR  [email or web address] OR  https://www.cdc.gov/steadi/patient.html

## 2023-11-20 NOTE — PRE-ANESTHESIA EVALUATION ADULT - NSANTHPMHFT_GEN_ALL_CORE
37 y/o F with pmhx hiatal hernia with GERD, mild asthma, IBS, anxiety and depression presenting for lap cholecystetomy and lap b/l salpingectomy. 39 y/o F with pmhx hiatal hernia with GERD, mild asthma, IBS, anxiety and depression presenting for lap cholecystetomy and lap b/l salpingectomy.    Denies cp, sob. Endorsing mild GERD-like symptoms, denies any PO intake since 7pm on 11/19/23.

## 2023-11-20 NOTE — PATIENT PROFILE ADULT - FALL HARM RISK - UNIVERSAL INTERVENTIONS
Bed in lowest position, wheels locked, appropriate side rails in place/Call bell, personal items and telephone in reach/Instruct patient to call for assistance before getting out of bed or chair/Non-slip footwear when patient is out of bed/[place] to call system/Physically safe environment - no spills, clutter or unnecessary equipment/Purposeful Proactive Rounding/Room/bathroom lighting operational, light cord in reach

## 2023-11-20 NOTE — ASU DISCHARGE PLAN (ADULT/PEDIATRIC) - CARE PROVIDER_API CALL
Isai Caputo  Colon/Rectal Surgery  [address], [address]  [place] [address]  Phone: [phone]  Fax: [phone]  Follow Up Time: 2 weeks    Asher Bell  Obstetrics and Gynecology  [address] [place] [address], [address]  [place] [address]  Phone: [phone]  Fax: [phone]  Follow Up Time: 2 weeks

## 2023-11-20 NOTE — BRIEF OPERATIVE NOTE - NSICDXBRIEFPOSTOP_GEN_ALL_CORE_FT
POST-OP DIAGNOSIS:  Multiparity 20-Nov-2023 17:13:20  Jolanta Dc  
POST-OP DIAGNOSIS:  Biliary dyskinesia 20-Nov-2023 18:41:23  Rufina Ellison

## 2023-11-20 NOTE — PRE-ANESTHESIA EVALUATION ADULT - NSANTHPEFT_GEN_ALL_CORE
PHYSICAL EXAM:  GENERAL: NAD  CHEST/LUNG: Breathing comfortably on room air   HEART: Regular rate and rhythm

## 2023-11-20 NOTE — ASU DISCHARGE PLAN (ADULT/PEDIATRIC) - PROVIDER TOKENS
PROVIDER:[TOKEN:[[id number]],FOLLOWUP:[2 weeks]],PROVIDER:[TOKEN:[[id number]],FOLLOWUP:[2 weeks]]

## 2023-11-20 NOTE — BRIEF OPERATIVE NOTE - OPERATION/FINDINGS
Critical view of safety obtained; cystic duct and cystic artery clipped and divided
Uncomplicated bilateral salpingectomy. Excellent hemostasis at surgical sites.

## 2023-11-20 NOTE — BRIEF OPERATIVE NOTE - NSICDXBRIEFPROCEDURE_GEN_ALL_CORE_FT
PROCEDURES:  Laparoscopic salpingectomy 20-Nov-2023 17:12:35  Jolanta Dc  
PROCEDURES:  Laparoscopic cholecystectomy 20-Nov-2023 18:40:49  Rufina Ellison

## 2023-11-20 NOTE — ASU DISCHARGE PLAN (ADULT/PEDIATRIC) - ASU DC SPECIAL INSTRUCTIONSFT
PAIN: You may continue to take Tylenol and Ibuprofen (Advil, Motrin) over the counter and oxycodone as prescribed for pain.   WOUND CARE:  You should allow warm soapy water to run down the wound in the shower. You do not need to scrub the area. You do not have any stitches that need to be removed.  BATHING: You may shower/sponge bathe 48 hours after your surgery date. Please do not soak or submerge the wound in water (bath, swimming) for 14 days after your surgery.  ACTIVITY: No heavy lifting, straining, or vigorous activity until your follow-up appointment in 2 weeks.   DIET: You may resume your regular diet.   FOLLOW-UP: Please call the office and make an appointment to follow up with Dr. Caputo and Dr. Bell in 2 weeks

## 2023-11-20 NOTE — PATIENT PROFILE ADULT - FALL HARM RISK - TYPE OF ASSESSMENT
no lesions,  no deformities,  chest wall non-tender, breathing is unlabored without accessory muscle use, lungs clear to auscultation bilaterally Admission

## 2023-11-20 NOTE — BRIEF OPERATIVE NOTE - NSICDXBRIEFPREOP_GEN_ALL_CORE_FT
PRE-OP DIAGNOSIS:  Biliary dyskinesia 20-Nov-2023 18:41:13  Rufina Ellison  
PRE-OP DIAGNOSIS:  Multiparity 20-Nov-2023 17:13:12  Jolanta Dc

## 2023-11-20 NOTE — BRIEF OPERATIVE NOTE - COMMENTS
Please see Surg brief op note for their procedure details  Dictation #: [id number] Please see Surg brief op note for their procedure details.   Dictation #: [id number]

## 2023-11-20 NOTE — PRE-OP CHECKLIST - ORDERS/MEDICATION ADMINISTRATION RECORD ON CHART
done
Anxiety    Asthma    Breast cancer in situ, left    COPD (chronic obstructive pulmonary disease)    DVT (deep venous thrombosis)  history of- not presently on A/C  Graves disease    Hyperlipidemia    Hypertension    Hypothyroid    Kidney cancer, primary, with metastasis from kidney to other site, left  LEft sided- s/p nephrectomy only- no chemo or RT  Obesity    Sleep apnea
done

## 2023-11-21 RX ORDER — OXYCODONE AND ACETAMINOPHEN 5; 325 MG/1; MG/1
1 TABLET ORAL
Qty: 1 | Refills: 0
Start: 2023-11-21

## 2023-11-27 LAB
SURGICAL PATHOLOGY STUDY: SIGNIFICANT CHANGE UP
SURGICAL PATHOLOGY STUDY: SIGNIFICANT CHANGE UP

## 2023-12-05 ENCOUNTER — APPOINTMENT (OUTPATIENT)
Dept: SURGERY | Facility: CLINIC | Age: 38
End: 2023-12-05
Payer: COMMERCIAL

## 2023-12-05 VITALS
OXYGEN SATURATION: 97 % | TEMPERATURE: 96.6 F | DIASTOLIC BLOOD PRESSURE: 73 MMHG | RESPIRATION RATE: 16 BRPM | SYSTOLIC BLOOD PRESSURE: 111 MMHG | HEART RATE: 74 BPM | WEIGHT: 130 LBS | HEIGHT: 63 IN | BODY MASS INDEX: 23.04 KG/M2

## 2023-12-05 DIAGNOSIS — Z09 ENCOUNTER FOR FOLLOW-UP EXAMINATION AFTER COMPLETED TREATMENT FOR CONDITIONS OTHER THAN MALIGNANT NEOPLASM: ICD-10-CM

## 2023-12-05 PROCEDURE — 99024 POSTOP FOLLOW-UP VISIT: CPT

## 2023-12-18 ENCOUNTER — NON-APPOINTMENT (OUTPATIENT)
Age: 38
End: 2023-12-18

## 2024-02-02 ENCOUNTER — APPOINTMENT (OUTPATIENT)
Dept: OBGYN | Facility: CLINIC | Age: 39
End: 2024-02-02
Payer: COMMERCIAL

## 2024-02-02 VITALS
DIASTOLIC BLOOD PRESSURE: 72 MMHG | SYSTOLIC BLOOD PRESSURE: 109 MMHG | HEIGHT: 63 IN | HEART RATE: 68 BPM | BODY MASS INDEX: 22.15 KG/M2 | WEIGHT: 125 LBS

## 2024-02-02 DIAGNOSIS — Z11.51 ENCOUNTER FOR SCREENING FOR HUMAN PAPILLOMAVIRUS (HPV): ICD-10-CM

## 2024-02-02 DIAGNOSIS — R92.30 DENSE BREASTS, UNSPECIFIED: ICD-10-CM

## 2024-02-02 DIAGNOSIS — Z01.419 ENCOUNTER FOR GYNECOLOGICAL EXAMINATION (GENERAL) (ROUTINE) W/OUT ABNORMAL FINDINGS: ICD-10-CM

## 2024-02-02 PROCEDURE — 99396 PREV VISIT EST AGE 40-64: CPT

## 2024-02-02 NOTE — PLAN
[FreeTextEntry1] : 37 yo, annual visit, stable  HCM - pap done today - vit D/exercise/calcium - rx breast mammo/sono - colon screening utd - f/u PCP for annual and appropriate immunizations - rto 1 year  FH of breast cancer s/p negative genetic testing -rx mammo/breast sono - pt to bring results - f/u richar paige score on mammo - consider yearly MRI, pt verbalized full understanding

## 2024-02-02 NOTE — HISTORY OF PRESENT ILLNESS
[FreeTextEntry1] : 39 yo presents for annual visit. She is doing well and has no complaints. Pt s/p lsc BS done concomitantly with Dr. Caputo during lsc cholecystectomy 23.  OBH: , contraception through vaginal ring GYNH: hx of HPV, normal colpo, pap smears PMH: hx of biliary dyskinesia, depression/anxiety, gallbladder malfunction PSH: lsc BS, lsc cholecystectomy 23. FH: DM (father), HTN, HLD (mother), bipolar disorder (father), breast cancer (PGM, PGA, MGM, mother), colon/rectal cancer (father) Social Hx: uses cannabis, former smoker, consumes alcohol occasionally Medications: Lamictal, Xanax Allergies: NKDA  [ColonoscopyDate] : 11/19

## 2024-02-02 NOTE — END OF VISIT
[FreeTextEntry3] :    I, Shannon Caba, acted as a scribe on behalf of Dr. Asher Bell on 02/02/2024.   All medical entries made by the scribe were at my, Dr. Asher Bell's, direction and personally dictated by me on 02/02/2024. I have reviewed the chart and agree that the record accurately reflects my personal performance of the history, physical exam, assessment, and plan. I have also personally directed, reviewed, and agreed with the chart.

## 2024-03-07 ENCOUNTER — APPOINTMENT (OUTPATIENT)
Dept: ULTRASOUND IMAGING | Facility: CLINIC | Age: 39
End: 2024-03-07
Payer: COMMERCIAL

## 2024-03-07 ENCOUNTER — APPOINTMENT (OUTPATIENT)
Dept: MAMMOGRAPHY | Facility: CLINIC | Age: 39
End: 2024-03-07
Payer: COMMERCIAL

## 2024-03-07 ENCOUNTER — RESULT REVIEW (OUTPATIENT)
Age: 39
End: 2024-03-07

## 2024-03-07 PROCEDURE — 77067 SCR MAMMO BI INCL CAD: CPT

## 2024-03-07 PROCEDURE — 76641 ULTRASOUND BREAST COMPLETE: CPT | Mod: 50

## 2024-03-07 PROCEDURE — 77063 BREAST TOMOSYNTHESIS BI: CPT

## 2024-03-31 LAB
CYTOLOGY CVX/VAG DOC THIN PREP: NORMAL
HPV HIGH+LOW RISK DNA PNL CVX: NOT DETECTED

## 2024-10-25 ENCOUNTER — APPOINTMENT (OUTPATIENT)
Dept: OBGYN | Facility: CLINIC | Age: 39
End: 2024-10-25

## 2024-10-25 VITALS
WEIGHT: 125 LBS | DIASTOLIC BLOOD PRESSURE: 84 MMHG | HEIGHT: 63 IN | BODY MASS INDEX: 22.15 KG/M2 | SYSTOLIC BLOOD PRESSURE: 124 MMHG

## 2024-10-25 DIAGNOSIS — N93.9 ABNORMAL UTERINE AND VAGINAL BLEEDING, UNSPECIFIED: ICD-10-CM

## 2024-10-25 DIAGNOSIS — N84.1 POLYP OF CERVIX UTERI: ICD-10-CM

## 2024-10-25 DIAGNOSIS — N94.6 DYSMENORRHEA, UNSPECIFIED: ICD-10-CM

## 2024-10-25 DIAGNOSIS — Z91.89 OTHER SPECIFIED PERSONAL RISK FACTORS, NOT ELSEWHERE CLASSIFIED: ICD-10-CM

## 2024-10-25 DIAGNOSIS — N89.8 OTHER SPECIFIED NONINFLAMMATORY DISORDERS OF VAGINA: ICD-10-CM

## 2024-10-25 DIAGNOSIS — Z12.39 ENCOUNTER FOR OTHER SCREENING FOR MALIGNANT NEOPLASM OF BREAST: ICD-10-CM

## 2024-10-25 PROCEDURE — 99213 OFFICE O/P EST LOW 20 MIN: CPT | Mod: 25

## 2024-10-25 PROCEDURE — 57500 BIOPSY OF CERVIX: CPT

## 2024-10-29 LAB — CORE LAB BIOPSY: NORMAL

## 2024-10-30 LAB
BV BACTERIA RRNA VAG QL NAA+PROBE: NOT DETECTED
C GLABRATA RNA VAG QL NAA+PROBE: NOT DETECTED
CANDIDA RRNA VAG QL PROBE: NOT DETECTED
T VAGINALIS RRNA SPEC QL NAA+PROBE: NOT DETECTED

## 2024-11-09 ENCOUNTER — NON-APPOINTMENT (OUTPATIENT)
Age: 39
End: 2024-11-09

## 2024-11-10 ENCOUNTER — NON-APPOINTMENT (OUTPATIENT)
Age: 39
End: 2024-11-10

## 2024-12-12 ENCOUNTER — NON-APPOINTMENT (OUTPATIENT)
Age: 39
End: 2024-12-12

## 2024-12-18 ENCOUNTER — APPOINTMENT (OUTPATIENT)
Dept: ULTRASOUND IMAGING | Facility: CLINIC | Age: 39
End: 2024-12-18
Payer: COMMERCIAL

## 2024-12-18 ENCOUNTER — APPOINTMENT (OUTPATIENT)
Dept: MRI IMAGING | Facility: CLINIC | Age: 39
End: 2024-12-18
Payer: COMMERCIAL

## 2024-12-18 ENCOUNTER — OUTPATIENT (OUTPATIENT)
Dept: OUTPATIENT SERVICES | Facility: HOSPITAL | Age: 39
LOS: 1 days | End: 2024-12-18
Payer: COMMERCIAL

## 2024-12-18 DIAGNOSIS — N93.9 ABNORMAL UTERINE AND VAGINAL BLEEDING, UNSPECIFIED: ICD-10-CM

## 2024-12-18 DIAGNOSIS — Z98.890 OTHER SPECIFIED POSTPROCEDURAL STATES: Chronic | ICD-10-CM

## 2024-12-18 PROCEDURE — 76856 US EXAM PELVIC COMPLETE: CPT

## 2024-12-18 PROCEDURE — A9585: CPT

## 2024-12-18 PROCEDURE — C8908: CPT

## 2024-12-18 PROCEDURE — 76830 TRANSVAGINAL US NON-OB: CPT

## 2024-12-18 PROCEDURE — 76830 TRANSVAGINAL US NON-OB: CPT | Mod: 26

## 2024-12-18 PROCEDURE — 77049 MRI BREAST C-+ W/CAD BI: CPT | Mod: 26

## 2024-12-18 PROCEDURE — 76856 US EXAM PELVIC COMPLETE: CPT | Mod: 26,59

## 2024-12-18 PROCEDURE — C8937: CPT

## 2024-12-20 DIAGNOSIS — R92.8 OTHER ABNORMAL AND INCONCLUSIVE FINDINGS ON DIAGNOSTIC IMAGING OF BREAST: ICD-10-CM

## 2025-01-06 ENCOUNTER — APPOINTMENT (OUTPATIENT)
Dept: MRI IMAGING | Facility: IMAGING CENTER | Age: 40
End: 2025-01-06
Payer: COMMERCIAL

## 2025-01-06 ENCOUNTER — RESULT REVIEW (OUTPATIENT)
Age: 40
End: 2025-01-06

## 2025-01-06 ENCOUNTER — OUTPATIENT (OUTPATIENT)
Dept: OUTPATIENT SERVICES | Facility: HOSPITAL | Age: 40
LOS: 1 days | End: 2025-01-06
Payer: COMMERCIAL

## 2025-01-06 DIAGNOSIS — Z98.890 OTHER SPECIFIED POSTPROCEDURAL STATES: Chronic | ICD-10-CM

## 2025-01-06 DIAGNOSIS — Z00.8 ENCOUNTER FOR OTHER GENERAL EXAMINATION: ICD-10-CM

## 2025-01-06 PROCEDURE — 77065 DX MAMMO INCL CAD UNI: CPT

## 2025-01-06 PROCEDURE — 77065 DX MAMMO INCL CAD UNI: CPT | Mod: 26,LT

## 2025-01-06 PROCEDURE — 88305 TISSUE EXAM BY PATHOLOGIST: CPT | Mod: 26

## 2025-01-06 PROCEDURE — 19085 BX BREAST 1ST LESION MR IMAG: CPT | Mod: LT

## 2025-01-06 PROCEDURE — A4648: CPT

## 2025-01-06 PROCEDURE — A9585: CPT

## 2025-01-06 PROCEDURE — 88305 TISSUE EXAM BY PATHOLOGIST: CPT

## 2025-01-06 PROCEDURE — 19085 BX BREAST 1ST LESION MR IMAG: CPT

## 2025-01-10 LAB — SURGICAL PATHOLOGY STUDY: SIGNIFICANT CHANGE UP

## (undated) DEVICE — PACK GYN LAPAROSCOPY

## (undated) DEVICE — PREP CHLORAPREP HI-LITE ORANGE 26ML

## (undated) DEVICE — SOL IRR POUR NS 0.9% 500ML

## (undated) DEVICE — MARKING PEN W RULER

## (undated) DEVICE — TROCAR COVIDIEN VERSAONE BLADED FIXATION 11MM STANDARD

## (undated) DEVICE — LIGASURE BLUNT TIP 37CM

## (undated) DEVICE — VENODYNE/SCD SLEEVE CALF LARGE

## (undated) DEVICE — GLV 6.5 PROTEXIS (WHITE)

## (undated) DEVICE — TROCAR GELPOINT MINI ADVANCED

## (undated) DEVICE — PREP BETADINE KIT

## (undated) DEVICE — MEDICATION LABELS W MARKER

## (undated) DEVICE — DRAPE INSTRUMENT POUCH 6.75" X 11"

## (undated) DEVICE — BLADE SCALPEL SAFETYLOCK #10

## (undated) DEVICE — TUBING INSUFFLATION LAP FILTER 10FT

## (undated) DEVICE — SOL IRR POUR H2O 250ML

## (undated) DEVICE — DRAPE TOWEL BLUE 17" X 24"

## (undated) DEVICE — TROCAR COVIDIEN VERSASTEP PLUS 11MM STANDARD

## (undated) DEVICE — BLADE SCALPEL SAFETYLOCK #15

## (undated) DEVICE — DRAPE LIGHT HANDLE COVER (BLUE)

## (undated) DEVICE — VALVE YELLOW PORT SEAL PLUS 5MM

## (undated) DEVICE — ENDOCATCH 10MM SPECIMEN POUCH

## (undated) DEVICE — UTERINE MANIPULATOR CLINICAL INNOVATIONS CLEARVIEW 7CM

## (undated) DEVICE — UTERINE MANIPULATOR COOPER SURGICAL 5MM 33CM GREEN

## (undated) DEVICE — GLV 7.5 PROTEXIS (WHITE)

## (undated) DEVICE — TROCAR APPLIED MEDICAL KII BALLOON BLUNT TIP 12MM X 100MM

## (undated) DEVICE — DRAPE 3/4 SHEET W REINFORCEMENT 56X77"

## (undated) DEVICE — RUMI TIP BLUE 6.7MM X 8CM

## (undated) DEVICE — GLV 7 PROTEXIS (WHITE)

## (undated) DEVICE — POSITIONER FOAM EGG CRATE ULNAR 2PCS (PINK)

## (undated) DEVICE — SPECIMEN CONTAINER 100ML

## (undated) DEVICE — WARMING BLANKET UPPER ADULT

## (undated) DEVICE — Device

## (undated) DEVICE — DRAPE MAYO STAND 30"

## (undated) DEVICE — DRSG STERISTRIPS 0.5 X 4"

## (undated) DEVICE — GOWN TRIMAX LG

## (undated) DEVICE — INSUFFLATION NDL COVIDIEN STEP 14G FOR STEP/VERSASTEP

## (undated) DEVICE — TROCAR COVIDIEN BLUNT TIP HASSAN 10MM

## (undated) DEVICE — TUBING STRYKEFLOW II SUCTION / IRRIGATOR